# Patient Record
Sex: FEMALE | Race: WHITE
[De-identification: names, ages, dates, MRNs, and addresses within clinical notes are randomized per-mention and may not be internally consistent; named-entity substitution may affect disease eponyms.]

---

## 2021-01-27 ENCOUNTER — HOSPITAL ENCOUNTER (OUTPATIENT)
Dept: HOSPITAL 35 - SJCVC | Age: 81
End: 2021-01-27
Attending: INTERNAL MEDICINE
Payer: COMMERCIAL

## 2021-01-27 DIAGNOSIS — Z82.49: ICD-10-CM

## 2021-01-27 DIAGNOSIS — I44.0: Primary | ICD-10-CM

## 2021-01-27 DIAGNOSIS — Z79.899: ICD-10-CM

## 2021-01-27 DIAGNOSIS — I49.5: ICD-10-CM

## 2021-01-27 DIAGNOSIS — E03.9: ICD-10-CM

## 2021-01-27 DIAGNOSIS — Z90.710: ICD-10-CM

## 2021-01-27 DIAGNOSIS — I73.9: ICD-10-CM

## 2021-01-27 DIAGNOSIS — I45.10: ICD-10-CM

## 2021-01-27 DIAGNOSIS — Z79.82: ICD-10-CM

## 2021-01-27 DIAGNOSIS — I10: ICD-10-CM

## 2021-01-27 DIAGNOSIS — E78.5: ICD-10-CM

## 2021-01-27 DIAGNOSIS — E66.9: ICD-10-CM

## 2021-01-27 DIAGNOSIS — Z98.890: ICD-10-CM

## 2021-02-05 ENCOUNTER — HOSPITAL ENCOUNTER (OUTPATIENT)
Dept: HOSPITAL 35 - CATH | Age: 81
Setting detail: OBSERVATION
LOS: 1 days | Discharge: HOME | End: 2021-02-06
Attending: INTERNAL MEDICINE | Admitting: INTERNAL MEDICINE
Payer: COMMERCIAL

## 2021-02-05 VITALS — DIASTOLIC BLOOD PRESSURE: 59 MMHG | SYSTOLIC BLOOD PRESSURE: 120 MMHG

## 2021-02-05 VITALS — SYSTOLIC BLOOD PRESSURE: 127 MMHG | DIASTOLIC BLOOD PRESSURE: 59 MMHG

## 2021-02-05 VITALS — DIASTOLIC BLOOD PRESSURE: 64 MMHG | SYSTOLIC BLOOD PRESSURE: 127 MMHG

## 2021-02-05 VITALS — DIASTOLIC BLOOD PRESSURE: 63 MMHG | SYSTOLIC BLOOD PRESSURE: 134 MMHG

## 2021-02-05 VITALS — SYSTOLIC BLOOD PRESSURE: 114 MMHG | DIASTOLIC BLOOD PRESSURE: 69 MMHG

## 2021-02-05 VITALS — DIASTOLIC BLOOD PRESSURE: 62 MMHG | SYSTOLIC BLOOD PRESSURE: 128 MMHG

## 2021-02-05 VITALS — DIASTOLIC BLOOD PRESSURE: 59 MMHG | SYSTOLIC BLOOD PRESSURE: 121 MMHG

## 2021-02-05 VITALS — HEIGHT: 67.01 IN | WEIGHT: 195 LBS | BODY MASS INDEX: 30.61 KG/M2

## 2021-02-05 VITALS — DIASTOLIC BLOOD PRESSURE: 75 MMHG | SYSTOLIC BLOOD PRESSURE: 150 MMHG

## 2021-02-05 DIAGNOSIS — Z79.82: ICD-10-CM

## 2021-02-05 DIAGNOSIS — I10: ICD-10-CM

## 2021-02-05 DIAGNOSIS — E78.5: ICD-10-CM

## 2021-02-05 DIAGNOSIS — I25.10: ICD-10-CM

## 2021-02-05 DIAGNOSIS — I49.5: Primary | ICD-10-CM

## 2021-02-05 DIAGNOSIS — I73.9: ICD-10-CM

## 2021-02-05 DIAGNOSIS — Z79.899: ICD-10-CM

## 2021-02-05 DIAGNOSIS — E66.9: ICD-10-CM

## 2021-02-05 LAB
ALBUMIN SERPL-MCNC: 3.9 G/DL (ref 3.4–5)
ALT SERPL-CCNC: 23 U/L (ref 14–59)
ANION GAP SERPL CALC-SCNC: 7 MMOL/L (ref 7–16)
APTT BLD: 26.4 SECONDS (ref 24.5–32.8)
AST SERPL-CCNC: 18 U/L (ref 15–37)
BASOPHILS NFR BLD AUTO: 0.5 % (ref 0–2)
BILIRUB SERPL-MCNC: 0.5 MG/DL (ref 0.2–1)
BUN SERPL-MCNC: 18 MG/DL (ref 7–18)
CALCIUM SERPL-MCNC: 9.7 MG/DL (ref 8.5–10.1)
CHLORIDE SERPL-SCNC: 103 MMOL/L (ref 98–107)
CO2 SERPL-SCNC: 31 MMOL/L (ref 21–32)
CREAT SERPL-MCNC: 1.2 MG/DL (ref 0.6–1)
EOSINOPHIL NFR BLD: 2.7 % (ref 0–3)
ERYTHROCYTE [DISTWIDTH] IN BLOOD BY AUTOMATED COUNT: 13 % (ref 10.5–14.5)
GLUCOSE SERPL-MCNC: 92 MG/DL (ref 74–106)
GRANULOCYTES NFR BLD MANUAL: 54.7 % (ref 36–66)
HCT VFR BLD CALC: 41.7 % (ref 37–47)
HGB BLD-MCNC: 13.9 GM/DL (ref 12–15)
INR PPP: 1
LYMPHOCYTES NFR BLD AUTO: 31.9 % (ref 24–44)
MCH RBC QN AUTO: 29 PG (ref 26–34)
MCHC RBC AUTO-ENTMCNC: 33.3 G/DL (ref 28–37)
MCV RBC: 87.1 FL (ref 80–100)
MONOCYTES NFR BLD: 10.2 % (ref 1–8)
NEUTROPHILS # BLD: 2.9 THOU/UL (ref 1.4–8.2)
PLATELET # BLD: 242 THOU/UL (ref 150–400)
POTASSIUM SERPL-SCNC: 3.8 MMOL/L (ref 3.5–5.1)
PROT SERPL-MCNC: 7.9 G/DL (ref 6.4–8.2)
PROTHROMBIN TIME: 10 SECONDS (ref 9.3–11.4)
RBC # BLD AUTO: 4.78 MIL/UL (ref 4.2–5)
SODIUM SERPL-SCNC: 141 MMOL/L (ref 136–145)
WBC # BLD AUTO: 5.3 THOU/UL (ref 4–11)

## 2021-02-05 PROCEDURE — 62900: CPT

## 2021-02-05 PROCEDURE — 62110: CPT

## 2021-02-05 PROCEDURE — 70005: CPT

## 2021-02-05 NOTE — NUR
[age]A [age] YEAR OLD FEMALE ADMITTED TO CCU ROOM 214 AFTER HAVING PACEMAKER
PLACED TODAY. VSS. IVF INFUSING PER ORDER. ARM IMMBOLIZER IN PLACE. PT DENIES
PAIN/SOA AT THIS TIME. PT TOLERATING LUNCH. PT  AT BEDSIDE. PLANS FOR
DISCHARGE IN THE MORNING.

## 2021-02-06 VITALS — DIASTOLIC BLOOD PRESSURE: 59 MMHG | SYSTOLIC BLOOD PRESSURE: 125 MMHG

## 2021-02-06 VITALS — DIASTOLIC BLOOD PRESSURE: 56 MMHG | SYSTOLIC BLOOD PRESSURE: 114 MMHG

## 2021-02-06 VITALS — DIASTOLIC BLOOD PRESSURE: 58 MMHG | SYSTOLIC BLOOD PRESSURE: 137 MMHG

## 2021-02-06 VITALS — SYSTOLIC BLOOD PRESSURE: 114 MMHG | DIASTOLIC BLOOD PRESSURE: 56 MMHG

## 2021-02-06 VITALS — DIASTOLIC BLOOD PRESSURE: 52 MMHG | SYSTOLIC BLOOD PRESSURE: 111 MMHG

## 2021-02-06 NOTE — NUR
ASSUMED CARE OF THE PATIENT AT 1900; AOX4/ UP TO BSC ONLY; SR/APACED ON THE
MONITOR; LT UPPER CHEST PACEMAKER SITE WITH NO DRAINAGE PRESENT AND CLEAN AND
DRY; NO C/O OF ACUTE PAIN; PLAN IS FOR PATIENT TO D/C TO HOME PENDING CXR AND
PACEMAKER INTERROGATION; WILL CONTINUE TO MONITOR AND FOLLOW POC.

## 2021-02-06 NOTE — NUR
PT CARE ASSUMED AT 0700. ASSESSMENTS AS CHARTED. MEDICATIONS AS CHARTED. LAC
IV. RH IV. SINUS RHYTHM 1ST DG AV. UP AD ISIDRO. TOILET. PACEMAKER INCISION
C/D/I. INTERROGATION PERFORMED, OKAY. PT DISCHARGED TO HOME. DISCHARGE
PAPERWORK SIGNED. TELEMETRY D/C'D. IV'S D/C'D

## 2021-02-08 NOTE — D
[facility]
[address]
[place]   [address]                     DISCHARGE SUMMARY             
_______________________________________________________________________________
 
Name:       CHAVO ANDRADETERESO REBOLLEDO         Room #:         214-P       [facility] 
M.R.#:      [id number]                       Account #:      [id number]  
Admission:  02/05/21    Attend Phys:    Keyur Rosario MD
Discharge:  02/06/21    Date of Birth:  [birth year]  
                                                          Report #: [id number]
                                                                    [id number]   
_______________________________________________________________________________
THIS REPORT FOR:  
 
cc:  Ole Marrufo James R. DO Lammoglia, Francisco J. MD                                    ~
DATE OF SERVICE:  02/05/2021
 
 
ADMITTING DIAGNOSIS:  Sick sinus syndrome with symptomatic bradycardia.
 
DISCHARGE DIAGNOSES:
1.  Sick sinus syndrome with symptomatic bradycardia.
2.  Hypertension.
3.  Peripheral vascular disease.
 
PROCEDURE PERFORMED:  Dual-chamber pacer implantation, Medtronic device.
 
FOLLOWUP:
1.  Dr. Toro in 7-10 days.
2.  Dr. Rosario's office 02/10/2021 for wound check and Dr. Rosario in 3
months.
 
DISCHARGE INSTRUCTIONS:
1.  Keep the incision dry for 7 days.
2.  No driving for 7 days.
3.  No elevation or tugging with a left arm for 6 weeks.
4.  Immobilizer to be worn continuously for 48 hours post-implant and then at
bedtime for 6 weeks.
 
BRIEF CLINICAL HISTORY:  See history and physical in the chart.
 
HOSPITAL COURSE:  The patient was admitted to the hospital and underwent
uncomplicated implantation of a Medtronic dual-chamber pacer.  Chest x-ray the
next morning demonstrated no significant pathology abnormalities and no
pneumothorax.  Thresholds were noted and were at target and unchanged from
implant.  Post-procedure, the patient did quite well without any limitations to
activity.  She is allowed to ambulate and discharged home in stable condition to
follow up with the previously stated discharge instructions and medications.
 
 
 
 
 
 
  <ELECTRONICALLY SIGNED>
   By: Akash Reeder MD    
  02/08/21     1615
D: 02/06/21 1014                           _____________________________________
T: 02/06/21 1103                           Akash Reeder MD      /nt

## 2021-02-11 NOTE — P
[facility]
[address]
[place]   [address]                     PROCEDURE REPORT              
_______________________________________________________________________________
 
Name:       TERESO ORTIZ         Room #:         214-P       [facility] ROMERO#:      [id number]                       Account #:      [id number]  
Admission:  02/05/21    Attend Phys:    Keyur Rosario MD
Discharge:  02/06/21    Date of Birth:  [birth year]  
                                                          Report #: [id number]
                                                                    [id number]   
_______________________________________________________________________________
THIS REPORT FOR:  
 
cc:  Ole Marrufo James R. DO Couchonnal, Luis F. MD                                        ~
 
DATE OF SERVICE:  02/05/2021
 
 
PROCEDURE:  Pacemaker implantation.
 
PREOPERATIVE DIAGNOSIS:  Sick sinus syndrome.
 
POSTOPERATIVE DIAGNOSIS:  Sick sinus syndrome.
 
HISTORY OF PRESENT ILLNESS:  The patient is a 80-year-old female with a history
of sick sinus syndrome here for pacemaker implantation.
 
ANESTHESIA:  The patient underwent MAC anesthesia with no anesthesia related
complications.
 
DESCRIPTION OF PROCEDURE:  The patient underwent informed consent.  We discussed
the details of the procedure including the risks, which include but not limited
to bleeding, infection, vascular damage, cardiac perforation, pneumothorax.  She
understood these risks and is willing to proceed.
 
The patient was brought to EP laboratory in fasting and sedated state, prepped
and draped in a sterile fashion.  She received IV antibiotics and underwent a
venogram showing patency of left axillary vein.  Next, lidocaine was injected. 
Incision was made under the left clavicle and a pocket was created.  Access was
obtained twice to left axillary vein using the extrathoracic approach and
sheaths were positioned using the modified Seldinger technique.  Leads were
positioned in the right ventricular apex and right atrial appendage both with
adequate pacing and sensing thresholds.  Leads were sutured to the prepectoral
fascia.  Device was connected.  Tug test performed.  Pocket was irrigated with
vancomycin.  Pocket closed in 2 layers and surgical glue placed at the outer
skin layer.  The patient awoke neurologically and hemodynamically intact.  No
complications and no significant bleeding.  The implanted pacemaker was a
Medtronic model number [id number], serial [id number].  Leads were 5076; atrial lead
was a serial #[id number] and the ventricular lead was serial #[id number]. 
Atrial lead demonstrated a P-wave of 2.1 millivolts, pacing impedance of 478
ohms, pacing threshold 0.5 volts at 0.5 milliseconds.  RV lead demonstrated
R-waves of 6.2 millivolts, pacing impedance 661 _ohms, pacing threshold 0.4
volts at 0.5 milliseconds.  Device was programmed to the DDD  mode.
 
CONCLUSIONS:
 
 
 
[facility]
[address]
[place]   [address]                     PROCEDURE REPORT              
_______________________________________________________________________________
 
Name:       TERESO ORTIZ         Room #:         214-P       [facility]..#:      [id number]                       Account #:      [id number]  
Admission:  02/05/21    Attend Phys:    Keyur Rosario MD
Discharge:  02/06/21    Date of Birth:  [birth year]  
                                                          Report #: [id number]
                                                                    [id number]   
_______________________________________________________________________________
 
1.  Successful dual-chamber pacemaker implantation.
2.  Satisfactory atrial and ventricular pacing and sensing thresholds.
 
 
 
 
 
 
 
 
 
 
 
 
 
 
 
 
 
 
 
 
 
 
 
 
 
 
 
 
 
 
 
 
 
 
 
 
 
 
 
 
 
  <ELECTRONICALLY SIGNED>
   By: Keyur Rosario MD        
  02/11/21     0828
D: 02/05/21 1338                           _____________________________________
T: 02/05/21 1844                           Keyur Rosario MD          /nt